# Patient Record
Sex: MALE | Race: WHITE | Employment: UNEMPLOYED | URBAN - NONMETROPOLITAN AREA
[De-identification: names, ages, dates, MRNs, and addresses within clinical notes are randomized per-mention and may not be internally consistent; named-entity substitution may affect disease eponyms.]

---
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
## 2025-06-26 ENCOUNTER — OFFICE VISIT (OUTPATIENT)
Dept: FAMILY MEDICINE CLINIC | Age: 20
End: 2025-06-26
Payer: COMMERCIAL

## 2025-06-26 VITALS
SYSTOLIC BLOOD PRESSURE: 126 MMHG | OXYGEN SATURATION: 97 % | WEIGHT: 174 LBS | DIASTOLIC BLOOD PRESSURE: 86 MMHG | RESPIRATION RATE: 16 BRPM | HEIGHT: 71 IN | BODY MASS INDEX: 24.36 KG/M2 | TEMPERATURE: 98 F | HEART RATE: 76 BPM

## 2025-06-26 DIAGNOSIS — R65.10 SIRS (SYSTEMIC INFLAMMATORY RESPONSE SYNDROME) (HCC): Primary | ICD-10-CM

## 2025-06-26 PROCEDURE — 99213 OFFICE O/P EST LOW 20 MIN: CPT | Performed by: NURSE PRACTITIONER

## 2025-06-26 SDOH — ECONOMIC STABILITY: FOOD INSECURITY: WITHIN THE PAST 12 MONTHS, YOU WORRIED THAT YOUR FOOD WOULD RUN OUT BEFORE YOU GOT MONEY TO BUY MORE.: NEVER TRUE

## 2025-06-26 SDOH — ECONOMIC STABILITY: FOOD INSECURITY: WITHIN THE PAST 12 MONTHS, THE FOOD YOU BOUGHT JUST DIDN'T LAST AND YOU DIDN'T HAVE MONEY TO GET MORE.: NEVER TRUE

## 2025-06-26 ASSESSMENT — ENCOUNTER SYMPTOMS
RESPIRATORY NEGATIVE: 1
EYES NEGATIVE: 1
COLOR CHANGE: 1
GASTROINTESTINAL NEGATIVE: 1

## 2025-06-26 ASSESSMENT — PATIENT HEALTH QUESTIONNAIRE - PHQ9
SUM OF ALL RESPONSES TO PHQ QUESTIONS 1-9: 0
SUM OF ALL RESPONSES TO PHQ QUESTIONS 1-9: 0
2. FEELING DOWN, DEPRESSED OR HOPELESS: NOT AT ALL
SUM OF ALL RESPONSES TO PHQ QUESTIONS 1-9: 0
SUM OF ALL RESPONSES TO PHQ QUESTIONS 1-9: 0
1. LITTLE INTEREST OR PLEASURE IN DOING THINGS: NOT AT ALL

## 2025-06-26 NOTE — PROGRESS NOTES
Corey Pisano is a 19 y.o. male who presents today for :  Chief Complaint   Patient presents with    Numbness    Joint Swelling    Sunburn     Vitals:    06/26/25 1446   BP: 126/86   Pulse: 76   Resp: 16   Temp: 98 °F (36.7 °C)   SpO2: 97%       HPI:     History of Present Illness  The patient presents for evaluation of sunburn.    He reports a recent sunburn incident on Saturday, despite the application of sunscreen. Since the sunburn, he has been experiencing persistent itching and swelling in his feet and lips upon waking up on Tuesday and Wednesday morning. Additionally, he noted a transient rash on his leg that appeared on Monday and resolved within a few hours. He also reports swelling in his hand and the presence of a trish on his foot, which he does not attribute to the sunburn or a bruise. He has no history of similar reactions to sunburn or any recent illness. His symptoms have been improving, with no new occurrences this morning. However, he continues to experience itching and peeling skin. He has been using aloe vera for relief and took ibuprofen for wrist pain but reports no other medication use.    He has a history of complex regional pain syndrome (CRPS) in his wrist, which was treated with physical therapy yesterday. He recalls a staph infection in seventh grade, which required surgical intervention on his forearm, and suspects this may have led to nerve damage.    PAST SURGICAL HISTORY:  Surgical intervention on forearm for staph infection in seventh grade.         There is no problem list on file for this patient.    No past medical history on file.   No past surgical history on file.  No family history on file.  Social History     Tobacco Use    Smoking status: Never    Smokeless tobacco: Never   Substance Use Topics    Alcohol use: No      No current outpatient medications on file.     No current facility-administered medications for this visit.     No Known Allergies  Health Maintenance   Topic